# Patient Record
Sex: FEMALE | Race: WHITE | NOT HISPANIC OR LATINO | Employment: OTHER | URBAN - METROPOLITAN AREA
[De-identification: names, ages, dates, MRNs, and addresses within clinical notes are randomized per-mention and may not be internally consistent; named-entity substitution may affect disease eponyms.]

---

## 2021-11-19 ENCOUNTER — IMMUNIZATION (OUTPATIENT)
Dept: INTERNAL MEDICINE | Facility: CLINIC | Age: 81
End: 2021-11-19
Payer: MEDICARE

## 2021-11-19 DIAGNOSIS — Z23 NEED FOR VACCINATION: Primary | ICD-10-CM

## 2021-11-19 PROCEDURE — 0064A COVID-19, MRNA, LNP-S, PF, 100 MCG/0.25 ML DOSE VACCINE (MODERNA BOOSTER): CPT | Mod: PBBFAC,CV19

## 2023-04-18 ENCOUNTER — OFFICE VISIT (OUTPATIENT)
Dept: URGENT CARE | Facility: CLINIC | Age: 83
End: 2023-04-18
Payer: COMMERCIAL

## 2023-04-18 VITALS
RESPIRATION RATE: 20 BRPM | DIASTOLIC BLOOD PRESSURE: 77 MMHG | OXYGEN SATURATION: 95 % | SYSTOLIC BLOOD PRESSURE: 146 MMHG | HEIGHT: 63 IN | BODY MASS INDEX: 26.93 KG/M2 | WEIGHT: 152 LBS | TEMPERATURE: 98 F | HEART RATE: 79 BPM

## 2023-04-18 DIAGNOSIS — H57.8A9 SENSATION OF FOREIGN BODY IN EYE: ICD-10-CM

## 2023-04-18 DIAGNOSIS — S05.01XA ABRASION OF RIGHT CORNEA, INITIAL ENCOUNTER: Primary | ICD-10-CM

## 2023-04-18 PROCEDURE — 99203 PR OFFICE/OUTPT VISIT, NEW, LEVL III, 30-44 MIN: ICD-10-PCS | Mod: S$GLB,,,

## 2023-04-18 PROCEDURE — 99203 OFFICE O/P NEW LOW 30 MIN: CPT | Mod: S$GLB,,,

## 2023-04-18 RX ORDER — ERYTHROMYCIN 5 MG/G
OINTMENT OPHTHALMIC 4 TIMES DAILY
Qty: 3.5 G | Refills: 0 | Status: SHIPPED | OUTPATIENT
Start: 2023-04-18 | End: 2023-04-25

## 2023-04-18 RX ORDER — MIRABEGRON 50 MG/1
1 TABLET, FILM COATED, EXTENDED RELEASE ORAL
COMMUNITY
Start: 2023-04-02

## 2023-04-18 RX ORDER — ROSUVASTATIN CALCIUM 10 MG/1
10 TABLET, COATED ORAL NIGHTLY
COMMUNITY
Start: 2023-03-14

## 2023-04-18 RX ORDER — IBANDRONATE SODIUM 150 MG/1
150 TABLET, FILM COATED ORAL
COMMUNITY
Start: 2022-12-02

## 2023-04-18 RX ORDER — ERGOCALCIFEROL 1.25 MG/1
CAPSULE ORAL
COMMUNITY
Start: 2023-02-15

## 2023-04-18 NOTE — PATIENT INSTRUCTIONS
Apply antibiotic eye ointment as directed x 7 days.  I have included instructions in your discharge paperwork on the proper way to instill the ointment into your eyes. It will help lubricate your eye and may help with the discomfort.     Rinse out your eye thoroughly with tap water when you get home as this is the best way to flush foreign bodies from your eye. Have good hand washing techniques with antibacterial soap.       Take tylenol and ibuprofen every 4 hours as needed for pain. Always take NSAIDs with food and water to decrease GI upset.     If your symptoms do not get better within the next 5-7 days you will need to follow-up with your primary care provider.      Please go to the ER if you develop eye pain, facial pain/swelling, high fevers, change in your vision.

## 2023-04-18 NOTE — PROGRESS NOTES
"Subjective:      Patient ID: Verónica Jolley is a 83 y.o. female.    Vitals:  height is 5' 3" (1.6 m) and weight is 68.9 kg (152 lb). Her oral temperature is 98.4 °F (36.9 °C). Her blood pressure is 146/77 (abnormal) and her pulse is 79. Her respiration is 20 and oxygen saturation is 95%.     Chief Complaint: Eye Pain    This is a 83 y.o. female who presents today with a chief complaint of foreign body in right eye that she first noticed when she woke up this am. Patient's eye is red with clear tearing and hurts to blink. She cannot recall coming into contact with something to her eye. She slept with her CPAP on last night. No vision changes, fevers, eye swelling.         Eye Pain   The right eye is affected. This is a new problem. The current episode started today. The problem occurs constantly. The problem has been unchanged. The injury mechanism was a foreign body. The pain is at a severity of 4/10. The pain is mild. There is No known exposure to pink eye. She Does not wear contacts. Associated symptoms include eye redness and a foreign body sensation. Pertinent negatives include no blurred vision, eye discharge, double vision, fever or itching. She has tried eye drops for the symptoms. The treatment provided no relief.     Constitution: Negative for chills, fatigue and fever.   Eyes:  Positive for eye pain and eye redness. Negative for eye trauma, eye discharge, eye itching, vision loss, double vision, blurred vision and eyelid swelling.   Musculoskeletal:  Negative for muscle ache.    Objective:     Physical Exam   Constitutional: She is oriented to person, place, and time. She appears well-developed.   HENT:   Head: Normocephalic and atraumatic.   Ears:   Right Ear: External ear normal.   Left Ear: External ear normal.   Nose: Nose normal.   Mouth/Throat: Oropharynx is clear and moist.   Eyes: Conjunctivae, EOM and lids are normal. Pupils are equal, round, and reactive to light. Lids are everted and swept, no " foreign bodies found. Right eye exhibits no discharge, no exudate and no hordeolum. No foreign body present in the right eye. Right conjunctiva is not injected.   Slit lamp exam:       The right eye shows no corneal abrasion. Extraocular movement intact   Neck: Trachea normal and phonation normal. Neck supple.   Musculoskeletal: Normal range of motion.         General: Normal range of motion.   Neurological: She is alert and oriented to person, place, and time.   Skin: Skin is warm, dry and intact.   Psychiatric: Her speech is normal and behavior is normal. Judgment and thought content normal.   Nursing note and vitals reviewed.    Vision Screening    Right eye Left eye Both eyes   Without correction      With correction 20/40 20/30 20/25            Eye exam completed. Proparacaine and fluorescein drops given. Slit lamp used. No foreign body or lesions noted.       Assessment:     1. Abrasion of right cornea, initial encounter    2. Sensation of foreign body in eye        Plan:       Abrasion of right cornea, initial encounter  -     erythromycin (ROMYCIN) ophthalmic ointment; Place into the right eye 4 (four) times daily. for 7 days  Dispense: 3.5 g; Refill: 0    Sensation of foreign body in eye        Negative slit lamp eye exam today. No foreign bodies noted. I flushed patient's eye with 1 saline flush and she completed tap water irrigation at the sink in the room. Will cover patient for corneal abrasion with erythromycin ointment. I asked patient to also rinse out eye when she gets home once again. She has seen an eye doctor before in the past and she will call for a follow up appointment. Strict ED precautions given.     Discussed results/diagnosis/plan with patient in clinic. Strict precautions given to patient to monitor for worsening signs and symptoms. Advised to follow up with PCP or specialist.  Explained side effects of medications prescribed with patient and informed him/her to discontinue use if he/she  has any side effects and to inform UC or PCP if this occurs. All questions answered. Strict ED verses clinic return precautions stressed and given in depth. Advised if symptoms worsens of fail to improve he/she should go to the Emergency Room. Discharge and follow-up instructions given verbally/printed with the patient who expressed understanding and willingness to comply with my recommendations. Patient voiced understanding and in agreement with current treatment plan. Patient exits the exam room in no acute distress. Conversant and engaged during discharge discussion, verbalized understanding.

## 2024-03-20 ENCOUNTER — TELEPHONE (OUTPATIENT)
Dept: PHARMACY | Facility: CLINIC | Age: 84
End: 2024-03-20
Payer: COMMERCIAL